# Patient Record
(demographics unavailable — no encounter records)

---

## 2025-07-23 NOTE — ASSESSMENT
[FreeTextEntry1] : Fracture, Proximal Phalanx of Fifth Digit of left Foot.   Questions and concerns of patient were addressed. Xray findings were explained to patient- showing minimal fracture of proximal phalanx of fifth digit of left foot Patient advised to limit physical activity for the next few months in order to promote bone healing.  Curtis splinting of the fourth and fifth digits of left foot were done with Coban.  New x-rays of left foot were ordered.  Patient will RTC in 6 weeks, and was advised to have x-rays completed prior to visit in order to assess progress of bone healing.

## 2025-07-23 NOTE — PHYSICAL EXAM
[General Appearance - Alert] : alert [2+] : left foot dorsalis pedis 2+ [Sensation] : the sensory exam was normal to light touch and pinprick [Delayed in the Right Toes] : capillary refills normal in right toes [Delayed in the Left Toes] : capillary refills normal in the left toes [FreeTextEntry1] : Minimal signs of swelling and erythema on dorsal surface of left fifth digit

## 2025-07-23 NOTE — HISTORY OF PRESENT ILLNESS
[FreeTextEntry1] : Patient presents to clinic for chief complaint of pain on left foot. Patient went ot the emergency room last week for concern of fracture of left foot, in which the xrays showed proximal phalanx fracture of fifth digit of left foot. Patient ambulated to clinic with a surgical shoe.

## 2025-07-23 NOTE — END OF VISIT
[] : Resident [FreeTextEntry3] : Patient presents with a left foot injury sustained on Monday while playing soccer.  X-rays reviewed noted a Salter-Jenkins 2 fracture nondisplaced.  No surgical intervention.  Patient to continue ambulating in surgical shoe.  Activity modification.  Patient to hold off on sporting activity as well.  Follow-up 6 weeks with new x-rays My notes supersedes the above resident documentation in case of discrepancy. Correction for their notes is in my notes.